# Patient Record
Sex: FEMALE
[De-identification: names, ages, dates, MRNs, and addresses within clinical notes are randomized per-mention and may not be internally consistent; named-entity substitution may affect disease eponyms.]

---

## 2022-07-30 ENCOUNTER — TELEPHONE ENCOUNTER (OUTPATIENT)
Age: 72
End: 2022-07-30

## 2022-07-31 ENCOUNTER — TELEPHONE ENCOUNTER (OUTPATIENT)
Age: 72
End: 2022-07-31

## 2024-12-11 ENCOUNTER — LAB OUTSIDE AN ENCOUNTER (OUTPATIENT)
Dept: URBAN - METROPOLITAN AREA CLINIC 9 | Facility: CLINIC | Age: 74
End: 2024-12-11

## 2024-12-11 ENCOUNTER — OFFICE VISIT (OUTPATIENT)
Dept: URBAN - METROPOLITAN AREA CLINIC 9 | Facility: CLINIC | Age: 74
End: 2024-12-11
Payer: COMMERCIAL

## 2024-12-11 ENCOUNTER — DASHBOARD ENCOUNTERS (OUTPATIENT)
Age: 74
End: 2024-12-11

## 2024-12-11 VITALS
DIASTOLIC BLOOD PRESSURE: 82 MMHG | WEIGHT: 183 LBS | HEIGHT: 62 IN | BODY MASS INDEX: 33.68 KG/M2 | SYSTOLIC BLOOD PRESSURE: 160 MMHG

## 2024-12-11 DIAGNOSIS — K22.4 ESOPHAGEAL SPASM: ICD-10-CM

## 2024-12-11 DIAGNOSIS — K21.9 CHRONIC GERD: ICD-10-CM

## 2024-12-11 DIAGNOSIS — K44.9 HIATAL HERNIA: ICD-10-CM

## 2024-12-11 DIAGNOSIS — R13.19 ESOPHAGEAL DYSPHAGIA: ICD-10-CM

## 2024-12-11 PROBLEM — 266434009: Status: ACTIVE | Noted: 2024-12-11

## 2024-12-11 PROBLEM — 235595009: Status: ACTIVE | Noted: 2024-12-11

## 2024-12-11 PROBLEM — 84089009: Status: ACTIVE | Noted: 2024-12-11

## 2024-12-11 PROCEDURE — 99204 OFFICE O/P NEW MOD 45 MIN: CPT | Performed by: INTERNAL MEDICINE

## 2024-12-11 RX ORDER — SITAGLIPTIN 100 MG/1
1 TABLET TABLET, FILM COATED ORAL ONCE A DAY
Status: ACTIVE | COMMUNITY

## 2024-12-11 RX ORDER — BECLOMETHASONE DIPROPIONATE HFA 40 UG/1
1 PUFF AEROSOL, METERED RESPIRATORY (INHALATION) TWICE A DAY
Status: ACTIVE | COMMUNITY

## 2024-12-11 RX ORDER — FLUTICASONE PROPIONATE 110 UG/1
2 PUFFS AEROSOL, METERED RESPIRATORY (INHALATION) TWICE A DAY
Status: DISCONTINUED | COMMUNITY

## 2024-12-11 RX ORDER — LISINOPRIL 20 MG/1
1 TABLET TABLET ORAL ONCE A DAY
Status: ACTIVE | COMMUNITY

## 2024-12-11 RX ORDER — ROSUVASTATIN CALCIUM 5 MG/1
1 TABLET TABLET, COATED ORAL ONCE A DAY
Status: ACTIVE | COMMUNITY

## 2024-12-11 RX ORDER — ESTRADIOL 1 MG/1
1 TABLET TABLET ORAL ONCE A DAY
Status: ACTIVE | COMMUNITY

## 2024-12-11 RX ORDER — MONTELUKAST 10 MG/1
1 TABLET TABLET, FILM COATED ORAL ONCE A DAY
Status: ACTIVE | COMMUNITY

## 2024-12-11 RX ORDER — APIXABAN 2.5 MG/1
1 TABLET TABLET, FILM COATED ORAL TWICE DAILY
Status: DISCONTINUED | COMMUNITY

## 2024-12-11 RX ORDER — METFORMIN HYDROCHLORIDE 1000 MG/1
1 TABLET WITH A MEAL TABLET, FILM COATED ORAL ONCE A DAY
Status: ACTIVE | COMMUNITY

## 2024-12-11 RX ORDER — DIPHENHYDRAMINE HCL 2 %
1 CAPSULE AT BEDTIME AS NEEDED CREAM (GRAM) TOPICAL ONCE A DAY
Status: ACTIVE | COMMUNITY

## 2024-12-11 NOTE — PHYSICAL EXAM GASTROINTESTINAL
Abdomen , soft,  nondistended , no guarding or rigidity , no masses palpable , normal bowel sounds , Liver and Spleen, no hepatosplenomegaly , liver nontender , Abdomen , soft, nontender, nondistended , no guarding or rigidity , no masses palpable , normal bowel sounds , Liver and Spleen,  no hepatosplenomegaly , liver nontender

## 2024-12-11 NOTE — HPI-TODAY'S VISIT:
74 year old female presents today Patient completed an XR Barium swallow on 09.27.2024 showing a small hiatal hernia, mildly abnormal motility and Gerd. Patient completed an XR Aspiration study on 09.27.2024 showing no aspiration, oral and pharyngeal phases of the swallow are normal. Previous Upper Endoscopy completed 06.23.2016 by Dr. Avery Previous Colonoscopy completed 06.23.2016 by Dr. Avery, recommend repeat in 10 years. feels like she gets spasms of the esophagus for one year now feels like a pressure or pain spasm lasting momements like a minute or so. she gets reflux off and on as well but not daily

## 2025-03-31 ENCOUNTER — TELEPHONE ENCOUNTER (OUTPATIENT)
Dept: URBAN - METROPOLITAN AREA CLINIC 7 | Facility: CLINIC | Age: 75
End: 2025-03-31